# Patient Record
Sex: FEMALE | Race: WHITE
[De-identification: names, ages, dates, MRNs, and addresses within clinical notes are randomized per-mention and may not be internally consistent; named-entity substitution may affect disease eponyms.]

---

## 2017-06-16 ENCOUNTER — HOSPITAL ENCOUNTER (OUTPATIENT)
Dept: HOSPITAL 80 - FIMAGING | Age: 82
End: 2017-06-16
Attending: PSYCHIATRY & NEUROLOGY
Payer: COMMERCIAL

## 2017-06-16 DIAGNOSIS — H91.20: ICD-10-CM

## 2017-06-16 DIAGNOSIS — R41.3: Primary | ICD-10-CM

## 2017-06-16 PROCEDURE — A9585 GADOBUTROL INJECTION: HCPCS

## 2017-06-16 PROCEDURE — 70553 MRI BRAIN STEM W/O & W/DYE: CPT

## 2017-06-28 ENCOUNTER — HOSPITAL ENCOUNTER (OUTPATIENT)
Dept: HOSPITAL 80 - BMCIMAGING | Age: 82
End: 2017-06-28
Attending: INTERNAL MEDICINE
Payer: COMMERCIAL

## 2017-06-28 DIAGNOSIS — M79.604: ICD-10-CM

## 2017-06-28 DIAGNOSIS — R60.0: Primary | ICD-10-CM

## 2018-03-13 ENCOUNTER — HOSPITAL ENCOUNTER (OUTPATIENT)
Dept: HOSPITAL 80 - FED | Age: 83
Setting detail: OBSERVATION
LOS: 2 days | Discharge: HOME HEALTH | End: 2018-03-15
Attending: INTERNAL MEDICINE | Admitting: INTERNAL MEDICINE
Payer: COMMERCIAL

## 2018-03-13 DIAGNOSIS — E78.5: ICD-10-CM

## 2018-03-13 DIAGNOSIS — I10: ICD-10-CM

## 2018-03-13 DIAGNOSIS — F03.90: ICD-10-CM

## 2018-03-13 DIAGNOSIS — E87.1: ICD-10-CM

## 2018-03-13 DIAGNOSIS — Z79.82: ICD-10-CM

## 2018-03-13 DIAGNOSIS — R55: Primary | ICD-10-CM

## 2018-03-13 LAB — PLATELET # BLD: 233 10^3/UL (ref 150–400)

## 2018-03-13 PROCEDURE — 93005 ELECTROCARDIOGRAM TRACING: CPT

## 2018-03-13 PROCEDURE — 99285 EMERGENCY DEPT VISIT HI MDM: CPT

## 2018-03-13 PROCEDURE — G0378 HOSPITAL OBSERVATION PER HR: HCPCS

## 2018-03-13 PROCEDURE — 97166 OT EVAL MOD COMPLEX 45 MIN: CPT

## 2018-03-13 PROCEDURE — 92523 SPEECH SOUND LANG COMPREHEN: CPT

## 2018-03-13 NOTE — CPEKG
Heart Rate: 90

RR Interval: 667

P-R Interval: 172

QRSD Interval: 82

QT Interval: 344

QTC Interval: 421

P Axis: 22

QRS Axis: 26

T Wave Axis: 27

EKG Severity - BORDERLINE ECG -

EKG Impression: SINUS RHYTHM

EKG Impression: CONSIDER ANTERIOR INFARCT

Electronically Signed By: Paul Langford 14-Mar-2018 05:26:23

## 2018-03-14 LAB — PLATELET # BLD: 196 10^3/UL (ref 150–400)

## 2018-03-14 RX ADMIN — LISINOPRIL SCH MG: 20 TABLET ORAL at 12:54

## 2018-03-14 RX ADMIN — OXYCODONE HYDROCHLORIDE AND ACETAMINOPHEN SCH MG: 500 TABLET ORAL at 12:55

## 2018-03-14 RX ADMIN — ENOXAPARIN SODIUM SCH MG: 100 INJECTION SUBCUTANEOUS at 09:28

## 2018-03-14 NOTE — HOSPPROG
Hospitalist Progress Note


Assessment/Plan: 








88 yo F w/ HTN presents w/ syncope of unclear etiology.








*Syncope - Unclear etiology, no preceding symptoms to provide clue as to cause


-on HCTZ, likely dehydrated


-reviewed telemetry monitor: she has been  in sinus w PAC's


-12 lead shows sinus


-PT and OT


-trop negative





*recent upper respiratory virus illness





*hyponatremia


-improved w fluids


-cont hold hctz





*htn


-resumed lisinopril





*plan: will ask ST to do a cognitive evaluation, patient somewhat forgetful 

about short term info, will await PT and OT.  Patient lives alone and I'm 

concerned about her returning home.  Will f/u and see their evaluations.





Subjective: Latosha has no complaints, no headache.


Objective: 


 Vital Signs











Temp Pulse Resp BP Pulse Ox


 


 37.2 C   82   20   125/73 H  93 


 


 03/14/18 11:09  03/14/18 11:09  03/14/18 11:09  03/14/18 11:09  03/14/18 11:09








 Laboratory Results





 03/14/18 05:44 





 03/14/18 05:44 





 











 03/13/18 03/14/18 03/15/18





 05:59 05:59 05:59


 


Intake Total   100


 


Output Total   300


 


Balance   -200














- Physical Exam


Constitutional: no apparent distress, other (slender)


Eyes: PERRL


Ears, Nose, Mouth, Throat: hearing normal


Cardiovascular: regular rate and rhythym


Respiratory: no respiratory distress


Skin: warm


Neurologic: AAOx3


Psychiatric: interacting appropriately, poor insight, poor memory





ICD10 Worksheet


Patient Problems: 


 Problems











Problem Status Onset


 


Syncope Acute

## 2018-03-14 NOTE — ASMTCMCOM
CM Note

 

CM Note                       

Notes:

Spoke w/pt, states she lives alone and has someone come into help her but she could not remember 

how many days or from what agency. Pt asked CM to call son who "deals with all that". CM called son 


Ventura and left a message.

PT/OT/SLP still need to eval.



DC Plan: TBD

 

Date Signed:  03/14/2018 03:21 PM

Electronically Signed By:Cristina Fitzpatrick RN

## 2018-03-14 NOTE — EDPHY
H & P


Stated Complaint: PASSED OUT 1810,HA EARLIER,VOMIT X1, FIGHTING COLD RECENTLY


Time Seen by Provider: 03/13/18 22:07


HPI/ROS: 





Chief Complaint:  Syncope





HPI:  89-year-old woman with a history of hypertension whose had very mild 

upper respiratory congestion for the last couple days was walking down the 

hallway of her home earlier this evening with her home health aide when she had 

a witnessed syncopal event.  Patient was lowered to the floor.  Did not hit her 

head.  There were no preceding symptoms.  No chest pain.  No shortness of 

breath.  No palpitations.  About an hour later she was sitting at the dining 

room table and had 1 emesis.  No fevers or chills.  No cough.  No nausea 

vomiting or diarrhea.





ROS:  10 point Review of Systems is negative except as noted in the HPI.





PMH:  Hypertension





Medications:  Hydrochlorothiazide, lisinopril, atorvastatin, aspirin 81 mg daily





Social History: No smoking, no alcohol,  no recreational drug use





Family History: non-contributory





Physical Exam:


Gen: Awake, Alert, No Distress


HEENT:  


     Nose: no rhinorrhea


     Eyes: PERRLA, EOMI


     Mouth: Moist mucosa 


Neck: Supple, no JVD


Chest: nontender, lungs clear to auscultation


Heart: S1, S2 normal, 2 in 6 systolic ejection murmur at the left sternal border


Abd: Soft, non-tender, no guarding


Back: no CVA tenderness, no midline tenderness 


Ext: no edema, non-tender


Skin: no rash


Neuro: CN II-XII intact, Sensation grossly intact, Strength 5/5 in bilateral 

upper and lower extremities








- Personal History


Current Tetanus/Diphtheria Vaccine: Yes





- Medical/Surgical History


Hx Asthma: No


Hx Chronic Respiratory Disease: No


Hx Diabetes: No


Hx Cardiac Disease: No


Hx Renal Disease: No


Hx Cirrhosis: No


Hx Alcoholism: No


Hx HIV/AIDS: No


Hx Splenectomy or Spleen Trauma: No


Other PMH: DEMENTIA, HIGH CHOL, HTN





- Social History


Smoking Status: Never smoked


Constitutional: 





 Initial Vital Signs











Temperature (C)  36.6 C   03/13/18 20:50


 


Heart Rate  90   03/13/18 20:50


 


Respiratory Rate  18   03/13/18 20:50


 


Blood Pressure  154/92 H  03/13/18 20:50


 


O2 Sat (%)  98   03/13/18 20:50








 











O2 Delivery Mode               Room Air














Allergies/Adverse Reactions: 


 





meperidine [Meperidine] Allergy (Intermediate, Verified 03/13/18 20:56)


 agitation, disorientation








Home Medications: 














 Medication  Instructions  Recorded


 


Aspirin  03/13/18


 


Atorvastatin Calcium  03/13/18


 


Coq10  03/13/18


 


Fish Oil 1,200 mg Softgel  03/13/18


 


Hydrochlorothiazide [HCTZ (*)]  03/13/18


 


Lisinopril  03/13/18


 


Melatonin  03/13/18


 


Vitamin A  03/13/18


 


Vitamin D3  03/13/18


 


Vitamin E  03/13/18














Medical Decision Making





- Diagnostics


EKG Interpretation: 





ECG time 9:10 p.m. Sinus rhythm with a rate of 90, normal axis, normal intervals

, no acute ST or T-wave changes.


ED Course/Re-evaluation: 





89-year-old woman who had an unprovoked syncopal episode while walking this 

evening.  She does appear mildly clinically dehydrated.  Her sodium is a bit 

low 128. No acute changes on her ECG.  Troponin is normal.  I have discussed 

with Dr. Herrmann, hospitalist.  Will admit to [facility] telemetry for 

gentle hydration and further monitoring.  





- Data Points


Laboratory Results: 





 Laboratory Results





 03/13/18 21:15 





 03/13/18 21:15 





 











  03/13/18 03/13/18





  21:15 21:15


 


WBC    8.88 10^3/uL 10^3/uL





    (3.80-9.50) 


 


RBC    4.81 10^6/uL 10^6/uL





    (4.18-5.33) 


 


Hgb    15.3 g/dL g/dL





    (12.6-16.3) 


 


Hct    43.8 % %





    (38.0-47.0) 


 


MCV    91.1 fL fL





    (81.5-99.8) 


 


MCH    31.8 pg pg





    (27.9-34.1) 


 


MCHC    34.9 g/dL g/dL





    (32.4-36.7) 


 


RDW    13.9 % %





    (11.5-15.2) 


 


Plt Count    233 10^3/uL 10^3/uL





    (150-400) 


 


MPV    8.5 fL L fL





    (8.7-11.7) 


 


Neut % (Auto)    90.9 % H %





    (39.3-74.2) 


 


Lymph % (Auto)    2.8 % L %





    (15.0-45.0) 


 


Mono % (Auto)    5.9 % %





    (4.5-13.0) 


 


Eos % (Auto)    0.0 % L %





    (0.6-7.6) 


 


Baso % (Auto)    0.2 % L %





    (0.3-1.7) 


 


Nucleat RBC Rel Count    0.0 % %





    (0.0-0.2) 


 


Absolute Neuts (auto)    8.07 10^3/uL H 10^3/uL





    (1.70-6.50) 


 


Absolute Lymphs (auto)    0.25 10^3/uL L 10^3/uL





    (1.00-3.00) 


 


Absolute Monos (auto)    0.52 10^3/uL 10^3/uL





    (0.30-0.80) 


 


Absolute Eos (auto)    0.00 10^3/uL L 10^3/uL





    (0.03-0.40) 


 


Absolute Basos (auto)    0.02 10^3/uL 10^3/uL





    (0.02-0.10) 


 


Absolute Nucleated RBC    0.00 10^3/uL 10^3/uL





    (0-0.01) 


 


Immature Gran %    0.2 % %





    (0.0-1.1) 


 


Immature Gran #    0.02 10^3/uL 10^3/uL





    (0.00-0.10) 


 


Sodium  128 mEq/L L mEq/L  





   (135-145)  


 


Potassium  4.5 mEq/L mEq/L  





   (3.5-5.2)  


 


Chloride  94 mEq/L L mEq/L  





   ()  


 


Carbon Dioxide  24 mEq/l mEq/l  





   (22-31)  


 


Anion Gap  10 mEq/L mEq/L  





   (8-16)  


 


BUN  20 mg/dL mg/dL  





   (7-23)  


 


Creatinine  0.5 mg/dL L mg/dL  





   (0.6-1.0)  


 


Estimated GFR  > 60   





   


 


Glucose  139 mg/dL H mg/dL  





   ()  


 


Calcium  8.5 mg/dL mg/dL  





   (8.5-10.4)  


 


Troponin I  < 0.012 ng/mL ng/mL  





   (0.000-0.034)  














Departure





- Departure


Disposition: [facility]


Clinical Impression: 


 Syncope





Condition: Fair


Referrals: 


Penelope Jaramillo MD [Primary Care Provider] - As per Instructions

## 2018-03-14 NOTE — PDGENHP
History and Physical





- Chief Complaint


Syncope





- History of Present Illness


88 yo F w/ hx of HTN presents after syncopal episode. Patient has had cold 

symptoms (congestion, fatigue) for 2 days. On the morning of admission she was 

walking with her CNA when she experienced a syncopal episode. Per report she 

was unconscious for about 3 minutes. Bystanders did not note any convulsions. 

She quickly returned to baseline mental status after the episode. She did have 

an episode of nause and vomiting shortly after. She has not had similar 

symptoms before and recently saw her PCP who was happy with her health. She 

denies any recent decrease in exercise tolerance, dyspnea, chest pain, etc.





History Information





- Allergies/Home Medication List


Allergies/Adverse Reactions: 








meperidine [Meperidine] Allergy (Intermediate, Verified 03/13/18 20:56)


 agitation, disorientation





Home Medications: 








Aspirin  03/13/18 [Last Taken Unknown]


Atorvastatin Calcium  03/13/18 [Last Taken Unknown]


Coq10  03/13/18 [Last Taken Unknown]


Fish Oil 1,200 mg Softgel  03/13/18 [Last Taken Unknown]


Hydrochlorothiazide [HCTZ (*)]  03/13/18 [Last Taken Unknown]


Lisinopril  03/13/18 [Last Taken Unknown]


Melatonin  03/13/18 [Last Taken Unknown]


Vitamin A  03/13/18 [Last Taken Unknown]


Vitamin D3  03/13/18 [Last Taken Unknown]


Vitamin E  03/13/18 [Last Taken Unknown]





I have personally reviewed and updated: family history, medical history





- Past Medical History


hypertension





- Family History


Additional family history: Asked, denies





- Social History


Smoking Status: Never smoked





Review of Systems


Review of Systems: 





ROS: 10pt was reviewed & negative except for what was stated in HPI & below





Physical Exam


Physical Exam: 

















Temp Pulse Resp BP Pulse Ox


 


 36.9 C   95   18   156/86 H  91 L


 


 03/14/18 01:28  03/14/18 01:28  03/14/18 01:28  03/14/18 01:28  03/14/18 01:28











Constitutional: no apparent distress, not in pain


Eyes: PERRL, EOMI


Ears, Nose, Mouth, Throat: moist mucous membranes, no oral mucosal ulcers


Cardiovascular: regular rate and rhythym, systolic murmur (2/6 @ LUSB), other (

Carotid upstroke not delayed)


Respiratory: no respiratory distress, clear to auscultation


Gastrointestinal: normoactive bowel sounds, soft, non-tender abdomen


Skin: warm, normal color


Musculoskeletal: full muscle strength, no muscle tenderness


Neurologic: AAOx3, CN II-XII Intact


Psychiatric: interacting appropriately, not anxious





Lab Data & Imaging Review





 03/13/18 21:15





 03/13/18 21:15














WBC  8.88 10^3/uL (3.80-9.50)   03/13/18  21:15    


 


RBC  4.81 10^6/uL (4.18-5.33)   03/13/18  21:15    


 


Hgb  15.3 g/dL (12.6-16.3)   03/13/18  21:15    


 


Hct  43.8 % (38.0-47.0)   03/13/18  21:15    


 


MCV  91.1 fL (81.5-99.8)   03/13/18  21:15    


 


MCH  31.8 pg (27.9-34.1)   03/13/18  21:15    


 


MCHC  34.9 g/dL (32.4-36.7)   03/13/18  21:15    


 


RDW  13.9 % (11.5-15.2)   03/13/18  21:15    


 


Plt Count  233 10^3/uL (150-400)   03/13/18  21:15    


 


MPV  8.5 fL (8.7-11.7)  L  03/13/18  21:15    


 


Neut % (Auto)  90.9 % (39.3-74.2)  H  03/13/18  21:15    


 


Lymph % (Auto)  2.8 % (15.0-45.0)  L  03/13/18  21:15    


 


Mono % (Auto)  5.9 % (4.5-13.0)   03/13/18  21:15    


 


Eos % (Auto)  0.0 % (0.6-7.6)  L  03/13/18  21:15    


 


Baso % (Auto)  0.2 % (0.3-1.7)  L  03/13/18  21:15    


 


Nucleat RBC Rel Count  0.0 % (0.0-0.2)   03/13/18  21:15    


 


Absolute Neuts (auto)  8.07 10^3/uL (1.70-6.50)  H  03/13/18  21:15    


 


Absolute Lymphs (auto)  0.25 10^3/uL (1.00-3.00)  L  03/13/18  21:15    


 


Absolute Monos (auto)  0.52 10^3/uL (0.30-0.80)   03/13/18  21:15    


 


Absolute Eos (auto)  0.00 10^3/uL (0.03-0.40)  L  03/13/18  21:15    


 


Absolute Basos (auto)  0.02 10^3/uL (0.02-0.10)   03/13/18  21:15    


 


Absolute Nucleated RBC  0.00 10^3/uL (0-0.01)   03/13/18  21:15    


 


Immature Gran %  0.2 % (0.0-1.1)   03/13/18  21:15    


 


Immature Gran #  0.02 10^3/uL (0.00-0.10)   03/13/18  21:15    


 


Sodium  128 mEq/L (135-145)  L  03/13/18  21:15    


 


Potassium  4.5 mEq/L (3.5-5.2)   03/13/18  21:15    


 


Chloride  94 mEq/L ()  L  03/13/18  21:15    


 


Carbon Dioxide  24 mEq/l (22-31)   03/13/18 21:15    


 


Anion Gap  10 mEq/L (8-16)   03/13/18  21:15    


 


BUN  20 mg/dL (7-23)   03/13/18  21:15    


 


Creatinine  0.5 mg/dL (0.6-1.0)  L  03/13/18  21:15    


 


Estimated GFR  > 60   03/13/18  21:15    


 


Glucose  139 mg/dL ()  H  03/13/18  21:15    


 


Calcium  8.5 mg/dL (8.5-10.4)   03/13/18  21:15    


 


Troponin I  < 0.012 ng/mL (0.000-0.034)   03/13/18  21:15    








Visualized and Interpreted EKG results: Yes


EKG Interpretation: Positive for: normal sinsus rhythm, other (poor R wave 

progression)





Assessment & Plan


Assessment: 








88 yo F w/ HTN presents w/ syncope of unclear etiology.








Plan: 


1. Syncope - Unclear etiology, no preceding symptoms to provide clue as to cause

; possibly related to cold symptoms and dehydration from diuretic therapy. 

Episode not c/w seizure; she denies preceding chest pain. Exam and clinical 

course not consistent with progressing valvular disease. 


- Admit for observation


- Monitor on telemetry


- Hold HCTZ, small IVF bolus


- Orthostatic VS


- Will hold off on TTE noting patient has been in excellent health until today


- PT/OT evaluations


2. Hyponatremia - Likely related to HCTZ with possible contribution from mild 

viral illness.


- Small NS bolus, recheck BMP in AM


- Hold HCTZ, considering discontinuing altogether noting age and high 

likelihood of complications


3. HTN - On HCTZ and lisinopril as outpatient. Hold HCTZ as above.





Diet - Regular


Ppx - LMWH


Code - Full


Dispo - Admit under observation status

## 2018-03-14 NOTE — CPEKG
Heart Rate: 86

RR Interval: 698

P-R Interval: 184

QRSD Interval: 80

QT Interval: 352

QTC Interval: 421

P Axis: 23

QRS Axis: 29

T Wave Axis: 19

EKG Severity - NORMAL ECG -

EKG Impression: SINUS RHYTHM

Electronically Signed By: Michael Darby 14-Mar-2018 08:21:13

## 2018-03-14 NOTE — ASMTCMCOM
CM Note

 

CM Note                       

Notes:

Received call from pt's son Ventura, he states pt has early dementia. Her  passed away a 

month ago, they have private pay caregivers for her through [facility] 7 days a week for about 10hrs a 

day.



PT/OT/SLP pending



 

 

Date Signed:  03/14/2018 03:46 PM

Electronically Signed By:Cristina Fitzpatrick RN

## 2018-03-15 VITALS
HEART RATE: 80 BPM | SYSTOLIC BLOOD PRESSURE: 146 MMHG | RESPIRATION RATE: 20 BRPM | TEMPERATURE: 98.2 F | OXYGEN SATURATION: 92 % | DIASTOLIC BLOOD PRESSURE: 76 MMHG

## 2018-03-15 RX ADMIN — OXYCODONE HYDROCHLORIDE AND ACETAMINOPHEN SCH MG: 500 TABLET ORAL at 12:22

## 2018-03-15 RX ADMIN — LISINOPRIL SCH MG: 20 TABLET ORAL at 12:22

## 2018-03-15 RX ADMIN — ENOXAPARIN SODIUM SCH MG: 100 INJECTION SUBCUTANEOUS at 10:10

## 2018-03-15 NOTE — HOSPPROG
Hospitalist Progress Note


Assessment/Plan: 








89 yo F w/ HTN presents w/ syncope of unclear etiology.








*Syncope - Unclear etiology, no preceding symptoms to provide clue as to cause


-on HCTZ, likely dehydrated


-reviewed telemetry monitor: she has been  in sinus w PAC's


-12 lead shows sinus


-PT and OT tp see


-trop negative





*recent upper respiratory virus illness





*hyponatremia


-improved w fluids


-cont hold hctz





*htn


-resumed lisinopril





*plan: dc home w home care, will notify Dr Jaramillo of patient's dc


Subjective: Latosha has no complaints, concerned she is getting too much food.


Objective: 


 Vital Signs











Temp Pulse Resp BP Pulse Ox


 


 37.4 C   87   19   136/75 H  89 L


 


 03/15/18 07:45  03/15/18 07:45  03/15/18 07:45  03/15/18 07:45  03/15/18 07:45








 Laboratory Results





 03/14/18 05:44 





 03/14/18 05:44 





 











 03/14/18 03/15/18 03/16/18





 05:59 05:59 05:59


 


Intake Total  500 


 


Output Total  600 


 


Balance  -100 














- Physical Exam


Constitutional: no apparent distress, other (thin)


Eyes: PERRL


Ears, Nose, Mouth, Throat: hearing normal


Respiratory: no respiratory distress


Gastrointestinal: normoactive bowel sounds


Skin: warm


Musculoskeletal: generalized weakness


Neurologic: AAOx3


Psychiatric: interacting appropriately, not anxious, not encephalopathic





ICD10 Worksheet


Patient Problems: 


 Problems











Problem Status Onset


 


Syncope Acute

## 2018-03-15 NOTE — ASMTLACE
LACE

 

Length of stay for            Answers:  1 day                                 

current admission                                                             

Acuity / Level of             Answers:  No                                    

Care: Did the patient                                                         

have an inpatient                                                             

admission?                                                                    

Comorbidities - select        Answers:  Dementia                              

all that apply                                                                

                                        Other                         Notes:  HTN

# of Emergency department     Answers:  1-2                                   

visits in the last 6                                                          

months                                                                        

Score: 6

 

Date Signed:  03/15/2018 01:12 PM

Electronically Signed By:Cristina Fitzpatrick RN

## 2018-03-15 NOTE — PDIAF
- Diagnosis


Diagnosis: syncope, hyponatremia


Code Status: Full Code





- Medication Management


Discharge Medications: 


 Medications to Continue on Transfer





Aspirin [Aspirin 81mg (*)] 81 mg PO DAILY18 03/13/18 [Last Taken 03/13/18]


Atorvastatin Calcium [Lipitor 20 mg (*)] 20 mg PO DAILY18 03/13/18 [Last Taken 

03/13/18]


Cholecalciferol Vit D3 [Vitamin D3 2000 units tab (OTC)] 2,000 units PO DAILY 03 /13/18 [Last Taken 03/13/18]


Lisinopril [Zestril 20 mg (*)] 20 mg PO DAILY@12 03/13/18 [Last Taken 03/13/18]


Melatonin [Melatonin 5 mg] 5 mg PO HS 03/13/18 [Last Taken 03/13/18]


Ascorbic Acid [Vitamin C 500 mg (*)] 500 mg PO DAILY@12 03/14/18 [Last Taken 03/ 13/18]


Herbals/Supplements -Info Only 1 ea PO DAILY 03/14/18 [Last Taken Unknown]








Discharge Medications: Refer to the Discharge Home Medication list for PRN 

reason.


PICC Care - Routine: N/A





- Orders


Services needed: Home Care, Registered Nurse, Speech Language Pathologist


Home Care Face to Face: I certify that this patient was under my care and that 

I had the required face-to-face encounter meeting the encounter requirements on 

the discharge day.  My findings support the fact that the patient is homebound 

as defined in


Home Care Face to Face Continued: CMS Chapter 7 Medicare Benefits Manual 30.1.1

, The condition of the patient is such that there exists a normal inability to 

leave home and consequently, leaving home would require a considerable and 

taxing effort.


Diet Recommendation: no restrictions on diet


Diet Texture: Regular Texture Diet


Additional: patient's HCTZ was stopped due to low sodium levels, Follow up with 

Dr Jaramillo to further discuss, Check blood pressure at each visit





- Follow Up Care


Current Providers and Referrals: 


Penelope Jaramillo MD [Primary Care Provider] - As per Instructions

## 2018-03-15 NOTE — ASMTCMCOM
CM Note

 

CM Note                       

Notes:

Spoke w/pt and daughter in law, CM will set up homecare with [facility] (RN/SLP) and set up MOW, meals 

to start on Monday. Ameya Whitehead called and message left regarding discharge plan.



DC Plan: Homecare/[facility] (RN/SLP) + MOW

 

Date Signed:  03/15/2018 01:10 PM

Electronically Signed By:Cristina Fitzpatrick RN

## 2018-03-15 NOTE — GDS
[f rep st]



                                                             DISCHARGE SUMMARY





DISCHARGE DIAGNOSIS:  

1.  Syncopal event.

2.  Recent upper respiratory viral illness.

3.  Hyponatremia.

4.  Hypertension.



HISTORY OF PRESENT ILLNESS:  Briefly, Latosha Wilson is a very sweet woman who is 89 years old.  Sh
anthony presented to the emergency room with syncope of unclear etiology.  It was noted that her sodium lev
els were low.  Her hydrochlorothiazide has been held.  She has had no further episodes.



HOSPITAL COURSE:  

1.  Syncope.  I reviewed her telemetry monitor. She has been in sinus with PACs.  I suspect that she 
was dehydrated.  Troponin was checked which was negative.

2.  Recent upper respiratory viral illness.  This was probably impacting her oral intake.

3.  Hyponatremia.  This improved with fluids.  I have recommended to hold her HCTZ until she follows 
up with her primary care provider.

4.  Hypertension.  Resumed her lisinopril.



DISCHARGE CONDITION:  Stable. Blood pressure is 136/75, O2 saturations on room air 90%, respiratory r
ate is 19, pulse is 87, temperature 37.4 Celsius.



MEDICATIONS AT DISCHARGE:  Please see the EMR.



DISCHARGE INSTRUCTIONS:  

1.  To further follow up with Dr. Jaramillo to discuss further care as far as holding her hydrochlorothia
zide.

2.  If she develops further syncopal episodes, to return to the ER.





Job #:  [id number]

## 2018-06-25 ENCOUNTER — HOSPITAL ENCOUNTER (EMERGENCY)
Dept: HOSPITAL 80 - FED | Age: 83
Discharge: HOME | End: 2018-06-25
Payer: COMMERCIAL

## 2018-06-25 VITALS — DIASTOLIC BLOOD PRESSURE: 68 MMHG | SYSTOLIC BLOOD PRESSURE: 127 MMHG

## 2018-06-25 DIAGNOSIS — I10: ICD-10-CM

## 2018-06-25 DIAGNOSIS — Z79.82: ICD-10-CM
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
## 2018-06-25 DIAGNOSIS — S61.451A: Primary | ICD-10-CM

## 2018-06-25 NOTE — EDPHY
H & P


Stated Complaint: bit by her dog r hand/l wrist yesterday evening


Time Seen by Provider: 06/25/18 10:34


HPI/ROS: 





CHIEF COMPLAINT:  Dog bite to right hand and left wrist





HISTORY OF PRESENT ILLNESS:  The patient was bit in the left wrist and right 

hand by her dog last night at approximately 8:00 p.m..  She sustained a 

superficial abrasion to the thenar eminence of the right hand.  The patient has 

developed a minimal surrounding amount of erythema.  She denies any painful 

range of motion.  She denies limited range of motion.  She reports her tetanus 

shot is up-to-date.





REVIEW OF SYSTEMS:


A comprehensive 10 point review of systems is otherwise negative aside from 

elements mentioned in the history of present illness.


Source: Patient


Exam Limitations: No limitations





- Personal History


Current Tetanus Diphtheria and Acellular Pertussis (TDAP): Yes





- Medical/Surgical History


Hx Asthma: No


Hx Chronic Respiratory Disease: No


Hx Diabetes: No


Hx Cardiac Disease: No


Hx Renal Disease: No


Hx Cirrhosis: No


Hx Alcoholism: No


Hx HIV/AIDS: No


Hx Splenectomy or Spleen Trauma: No


Other PMH: DEMENTIA, HIGH CHOL, HTN





- Social History


Smoking Status: Never smoked





- Physical Exam


Exam: 





General Appearance:  Alert, no distress


Respiratory:  There are no retractions, lungs are clear to auscultation


Cardiovascular:  Regular rate and rhythm


Gastrointestinal:  Abdomen is soft and nontender, no masses, bowel sounds normal


Neurological:  Neurologically intact bilateral upper lower extremities


Skin:  Puncture wound over left wrist, puncture wound right hand, superficial 

laceration been are eminence right hand


Musculoskeletal:  Normal range of motion all 4 extremities without evidence of 

effusion


Extremities:  symmetrical, full range of motion








Constitutional: 





 Initial Vital Signs











Temperature (C)  36.4 C   06/25/18 10:37


 


Heart Rate  74   06/25/18 10:37


 


Respiratory Rate  17   06/25/18 10:37


 


Blood Pressure  127/68 H  06/25/18 10:37


 


O2 Sat (%)  94   06/25/18 10:37








 











O2 Delivery Mode               Room Air














Allergies/Adverse Reactions: 


 





meperidine [Meperidine] Allergy (Intermediate, Verified 06/25/18 10:34)


 agitation, disorientation








Home Medications: 














 Medication  Instructions  Recorded


 


Aspirin [Aspirin 81mg (*)] 81 mg PO DAILY18 03/13/18


 


Atorvastatin Calcium [Lipitor 20 20 mg PO DAILY18 03/13/18





mg (*)]  


 


Cholecalciferol Vit D3 [Vitamin D3 2,000 units PO DAILY 03/13/18





2000 units tab (OTC)]  


 


Lisinopril [Zestril 20 mg (*)] 20 mg PO DAILY@12 03/13/18


 


Melatonin [Melatonin 5 mg] 5 mg PO HS 03/13/18


 


Ascorbic Acid [Vitamin C 500 mg 500 mg PO DAILY@12 03/14/18





(*)]  


 


Herbals/Supplements -Info Only 1 ea PO DAILY 03/14/18














Medical Decision Making


ED Course/Re-evaluation: 





The patient presents the ED after dog bite last night.  She has nonsuturable 

laceration over her thenar eminence in the right hand.  She has a puncture 

wound noted to the left wrist.  There is minimal surrounding erythema.  The 

patient's tetanus shot is updated.  She will be started on Augmentin.  She has 

been given customary aftercare and return precautions.  The patient does have a 

home care nurse with her in the emergency department who can observe for any 

progressive signs of worsening cellulitis.





Departure





- Departure


Disposition: Home, Routine, Self-Care


Clinical Impression: 


Dog bite, hand


Qualifiers:


 Encounter type: initial encounter 





Condition: Good


Instructions:  Animal Bite (ED)


Additional Instructions: 


1.  Take antibiotics as directed for next 10 days.


2. Return to the ED for markedly worsening pain, redness, fever or other 

concerns.  This may be the sign of worsening infection requiring more 

aggressive IV therapy and possible hospitalization.


3. Please follow up with your primary care provider for a wound check in the 

next several days.  


Referrals: 


Penelope Jaramillo MD [Primary Care Provider] - As per Instructions